# Patient Record
Sex: FEMALE
[De-identification: names, ages, dates, MRNs, and addresses within clinical notes are randomized per-mention and may not be internally consistent; named-entity substitution may affect disease eponyms.]

---

## 2018-12-05 ENCOUNTER — HOSPITAL ENCOUNTER (OUTPATIENT)
Dept: HOSPITAL 92 - BICMRI | Age: 53
Discharge: HOME | End: 2018-12-05
Attending: PHYSICIAN ASSISTANT
Payer: COMMERCIAL

## 2018-12-05 DIAGNOSIS — M19.012: ICD-10-CM

## 2018-12-05 DIAGNOSIS — M75.102: Primary | ICD-10-CM

## 2018-12-05 NOTE — MRI
MRI OF THE LEFT SHOULDER:

 

Date: 12-5-18

 

Provided Clinical History: 

Left shoulder pain. 

 

FINDINGS: 

There is low grade partial thickness bursal surface tearing involving the anterior distal supraspinat
us tendon at the sub plate. The compartments of the rotator cuff appear otherwise intact. 

 

The intraarticular segment of the long head biceps tendon demonstrates increased signal intensity on 
the fluid sensitive sequences. There is abnormal signal present in the region of the superior labrum 
compatible with SLAP tear. 

 

The amount of fluid within the glenohumeral joint appears physiologic. There is a physiologic amount 
of subacromial subdeltoid bursal fluid. 

 

The acromioclavicular joint osteoarthrosis is demonstrated without significant mass effect upon the s
ubjacent supraspinatus.  Rotator cuff muscular volume appears preserved. No focal concerning regional
 marrow or muscular signal abnormality is evident. 

 

IMPRESSION: 

1. Low grade partial thickness bursal surface tear involving the anterior distal supraspinatus tendon
 at the foot plate. 

2. Findings compatible with SLAP tear. 

3. Acromioclavicular joint osteoarthrosis. 

 

POS: [facility]

## 2019-09-23 ENCOUNTER — HOSPITAL ENCOUNTER (OUTPATIENT)
Dept: HOSPITAL 92 - BICMAMMO | Age: 54
Discharge: HOME | End: 2019-09-23
Attending: FAMILY MEDICINE
Payer: COMMERCIAL

## 2019-09-23 DIAGNOSIS — Z12.31: Primary | ICD-10-CM

## 2019-09-23 PROCEDURE — 77063 BREAST TOMOSYNTHESIS BI: CPT

## 2019-09-23 PROCEDURE — 77067 SCR MAMMO BI INCL CAD: CPT

## 2019-09-23 NOTE — MMO
Bilateral MAMMO Bilat Screen [facility].

 

CLINICAL HISTORY:

Patient is 54 years old and is seen for screening. The patient has no family

history of breast cancer.  The patient has no personal history of cancer.

 

VIEWS:

The views performed were:  bilateral craniocaudal with tomosynthesis and

bilateral mediolateral oblique with tomosynthesis.

 

FILMS COMPARED:

The present examination has been compared to prior imaging studies performed at



04/18/2018.

 

This study has been interpreted with the assistance of computer-aided detection.

 

MAMMOGRAM FINDINGS:

There are scattered fibroglandular densities.

 

Finding 1:  There are stable benign appearing calcifications seen in both

breasts.

 

Finding 2:  There are stable focal asymmetries seen in both breasts.

 

There are no suspicious masses, suspicious calcifications, or new areas of

architectural distortion.

 

IMPRESSION:

THERE IS NO MAMMOGRAPHIC EVIDENCE OF MALIGNANCY.

 

A ROUTINE FOLLOW-UP MAMMOGRAM IN 1 YEAR IS RECOMMENDED.

 

THE RESULTS OF THIS EXAM WERE SENT TO THE PATIENT.

 

ACR BI-RADS Category 2 - Benign finding

 

MAMMOGRAPHY NOTE:

 1. A negative mammogram report should not delay a biopsy if a dominant of

 clinically suspicious mass is present.

 2. Approximately 10% to 15% of breast cancers are not detected by

 mammography.

 3. Adenosis and dense breasts may obscure an underlying neoplasm.

 

 

Reported by: YENIFER PALMA MD

Electonically Signed: [id number]

## 2019-10-30 ENCOUNTER — HOSPITAL ENCOUNTER (OUTPATIENT)
Dept: HOSPITAL 92 - SDC | Age: 54
Discharge: HOME | End: 2019-10-30
Attending: INTERNAL MEDICINE
Payer: COMMERCIAL

## 2019-10-30 DIAGNOSIS — Z98.890: ICD-10-CM

## 2019-10-30 DIAGNOSIS — Z79.84: ICD-10-CM

## 2019-10-30 DIAGNOSIS — I10: ICD-10-CM

## 2019-10-30 DIAGNOSIS — E78.5: ICD-10-CM

## 2019-10-30 DIAGNOSIS — Z12.11: Primary | ICD-10-CM

## 2019-10-30 DIAGNOSIS — M06.9: ICD-10-CM

## 2019-10-30 DIAGNOSIS — E11.9: ICD-10-CM

## 2019-10-30 DIAGNOSIS — K64.9: ICD-10-CM

## 2019-10-30 DIAGNOSIS — K57.30: ICD-10-CM

## 2019-10-30 DIAGNOSIS — Z79.82: ICD-10-CM

## 2019-10-30 DIAGNOSIS — Z79.899: ICD-10-CM

## 2019-10-30 PROCEDURE — 0DJD8ZZ INSPECTION OF LOWER INTESTINAL TRACT, VIA NATURAL OR ARTIFICIAL OPENING ENDOSCOPIC: ICD-10-PCS | Performed by: INTERNAL MEDICINE

## 2019-10-30 NOTE — OP
DATE OF PROCEDURE:  10/30/2019



PROCEDURE PERFORMED:  Colonoscopy.



PREOPERATIVE DIAGNOSIS:  A 54-year-old  female, undergoing colonoscopy

for colon cancer screening. 



POSTOPERATIVE DIAGNOSES:  Small hemorrhoids.  Otherwise normal colonoscopy.  The

patient did have some retained fecal residue intermittently in the colon. 



DESCRIPTION OF PROCEDURE:  The patient was placed on her left lateral position and

was given sedation by Anesthesia Department.  A rectal exam was done before scope

was advanced into the rectum.  No lesions felt on rectal exam.  A Pentax video

colonoscope was introduced into the rectum and advanced down the cecum.  The mucosa

appeared normal throughout the colon with normal vascular pattern.  In the

appendiceal orifice, ileocecal valve, and cecum, no pathology seen.  Withdrawal of

scope from the cecum to ascending colon, hepatic flexure, no pathology seen.  In the

transverse colon, splenic flexure, descending colon, sigmoid colon, no lesion seen.

Retroflexion of scope in the rectum showed small hemorrhoids. 



DISCHARGE PLANNING:  This is a 54-year-old  female, came for a

colonoscopy for colon cancer screening.  She underwent colonoscopy and was found to

have no pathology except for hemorrhoids.  She has occasional sigmoid diverticular

disease. 



DISCHARGE RECOMMENDATIONS:  

1. The patient advised to call me if she develops abdominal pain, hematochezia, or

fever. 

2. In the absence of any above symptoms, she will come back to me in 2 weeks.

Recommend repeat colonoscopy in 10 years. 







Job ID:  [id number]

## 2019-10-30 NOTE — HP
HISTORY OF PRESENT ILLNESS:  This is a 54-year-old female comes for a colonoscopy

for colon cancer screening.  The patient has no specific GI symptoms.  She has no

family history of colon cancer.  She is undergoing colonoscopy for colon cancer

screening. 



ALLERGIES:  NONE.



MEDICAL ILLNESSES:  

1. Rheumatoid arthritis.

2. Hypertension.

3. Hyperlipidemia.

4. Diabetes mellitus.

5. Carpal tunnel syndrome release.



PHYSICAL EXAMINATION:

VITAL SIGNS:  Pulse is 70, blood pressure 130/80. 

HEENT:  Conjunctivae clear. 

CARDIOVASCULAR:  First and second heart sounds heard. 

LUNGS:  Clear to auscultation. 

ABDOMEN:  Soft.  No organomegaly.  No tenderness.  No masses.  Bowel sounds are

normal. 



ADMITTING DIAGNOSIS:  A 54-year-old female comes for a colonoscopy for colon cancer

screening. 







Job ID:  [id number]

## 2020-11-13 ENCOUNTER — HOSPITAL ENCOUNTER (OUTPATIENT)
Dept: HOSPITAL 92 - BICMAMMO | Age: 55
Discharge: HOME | End: 2020-11-13
Attending: FAMILY MEDICINE
Payer: COMMERCIAL

## 2020-11-13 DIAGNOSIS — Z12.31: Primary | ICD-10-CM

## 2020-11-13 PROCEDURE — 77067 SCR MAMMO BI INCL CAD: CPT

## 2020-11-13 PROCEDURE — 77063 BREAST TOMOSYNTHESIS BI: CPT

## 2020-11-16 NOTE — MMO
Bilateral MAMMO Bilat Screen [facility].

 

CLINICAL HISTORY:

Patient is 55 years old and is seen for screening. The patient has no family

history of breast cancer.  The patient has no personal history of cancer.

 

VIEWS:

The views performed were:  bilateral craniocaudal with tomosynthesis and

bilateral mediolateral oblique with tomosynthesis.

 

FILMS COMPARED:

The present examination has been compared to prior imaging studies performed at



on 04/18/2018, and at [facility] on 09/23/2019.

 

This study has been interpreted with the assistance of computer-aided detection.

 

MAMMOGRAM FINDINGS:

There are scattered fibroglandular densities.

 

There are benign appearing calcifications seen in both breasts.

 

There are no suspicious masses, suspicious calcifications, or new areas of

architectural distortion.

 

IMPRESSION:

THERE IS NO MAMMOGRAPHIC EVIDENCE OF MALIGNANCY.

 

A ROUTINE FOLLOW-UP MAMMOGRAM IN 1 YEAR IS RECOMMENDED.

 

THE RESULTS OF THIS EXAM WERE SENT TO THE PATIENT.

 

ACR BI-RADS Category 2 - Benign finding

 

MAMMOGRAPHY NOTE:

 1. A negative mammogram report should not delay a biopsy if a dominant of

 clinically suspicious mass is present.

 2. Approximately 10% to 15% of breast cancers are not detected by

 mammography.

 3. Adenosis and dense breasts may obscure an underlying neoplasm.

 

 

Reported by: JAROCHO SANTILLAN MD

Electonically Signed: [id number]